# Patient Record
Sex: MALE | Race: WHITE | URBAN - METROPOLITAN AREA
[De-identification: names, ages, dates, MRNs, and addresses within clinical notes are randomized per-mention and may not be internally consistent; named-entity substitution may affect disease eponyms.]

---

## 2019-11-14 ENCOUNTER — OFFICE VISIT (OUTPATIENT)
Dept: NEUROLOGY | Age: 64
End: 2019-11-14

## 2019-11-14 VITALS
DIASTOLIC BLOOD PRESSURE: 80 MMHG | SYSTOLIC BLOOD PRESSURE: 130 MMHG | HEART RATE: 61 BPM | HEIGHT: 74 IN | BODY MASS INDEX: 38.89 KG/M2 | WEIGHT: 303 LBS | TEMPERATURE: 97.6 F | OXYGEN SATURATION: 98 % | RESPIRATION RATE: 18 BRPM

## 2019-11-14 DIAGNOSIS — G25.0 ESSENTIAL TREMOR: Primary | ICD-10-CM

## 2019-11-14 PROBLEM — E66.01 SEVERE OBESITY (HCC): Status: ACTIVE | Noted: 2019-11-14

## 2019-11-14 RX ORDER — ASPIRIN 81 MG/1
TABLET ORAL DAILY
COMMUNITY

## 2019-11-14 RX ORDER — ASCORBIC ACID 500 MG
TABLET ORAL
COMMUNITY

## 2019-11-14 RX ORDER — LANOLIN ALCOHOL/MO/W.PET/CERES
1 CREAM (GRAM) TOPICAL DAILY
COMMUNITY

## 2019-11-14 RX ORDER — ATORVASTATIN CALCIUM 20 MG/1
TABLET, FILM COATED ORAL DAILY
COMMUNITY

## 2019-11-14 RX ORDER — CITALOPRAM 20 MG/1
TABLET, FILM COATED ORAL DAILY
COMMUNITY

## 2019-11-14 RX ORDER — BISMUTH SUBSALICYLATE 262 MG
1 TABLET,CHEWABLE ORAL DAILY
COMMUNITY

## 2019-11-14 NOTE — PROGRESS NOTES
HPI:  68-year-old male referred by Dr. Lisa Macdonald for evaluation of a tremor, comes with his wife. His wife note is a tremor of his head about 6 months ago. The patient never noticed it. He is not bothered by it. He reports that maybe he has a very minor tremor of both hands, but is not affecting his life in any significant way. He has a paternal uncle and aunt as well as a crossing on the paternal side who have tremors, 2 of them that he is in their heads. There is no specific diagnosis of Parkinson's disease in his family. His dad  at 80 without a tremor. 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 He denies any planovalgus, lateralizing weakness, changes in his speech or writing. He also has history of obstructive sleep apnea. He is under the care of Dr. Barbie Vang. He has been on CPAP compliantly every night for 4 to 5 years and responds great to this, cannot sleep without CPAP.       Social History     Socioeconomic History    Marital status:      Spouse name: Not on file    Number of children: Not on file    Years of education: Not on file    Highest education level: Not on file   Occupational History    Not on file   Social Needs    Financial resource strain: Not on file    Food insecurity:     Worry: Not on file     Inability: Not on file    Transportation needs:     Medical: Not on file     Non-medical: Not on file   Tobacco Use    Smoking status: Never Smoker    Smokeless tobacco: Never Used   Substance and Sexual Activity    Alcohol use: Not on file    Drug use: Not on file    Sexual activity: Not on file   Lifestyle    Physical activity:     Days per week: Not on file     Minutes per session: Not on file    Stress: Not on file   Relationships    Social connections:     Talks on phone: Not on file     Gets together: Not on file     Attends Hoahaoism service: Not on file     Active member of club or organization: Not on file     Attends meetings of clubs or organizations: Not on file     Relationship status: Not on file    Intimate partner violence:     Fear of current or ex partner: Not on file     Emotionally abused: Not on file     Physically abused: Not on file     Forced sexual activity: Not on file   Other Topics Concern    Not on file   Social History Narrative    Not on file       Family History   Problem Relation Age of Onset    Stroke Mother     Diabetes Father     Parkinsonism Paternal Aunt     Parkinsonism Paternal Uncle        Current Outpatient Medications   Medication Sig Dispense Refill    citalopram (CELEXA) 20 mg tablet Take  by mouth daily.  atorvastatin (LIPITOR) 20 mg tablet Take  by mouth daily.  aspirin delayed-release 81 mg tablet Take  by mouth daily.  multivitamin (ONE A DAY) tablet Take 1 Tab by mouth daily.  ascorbic acid, vitamin C, (VITAMIN C) 500 mg tablet Take  by mouth.  glucosamine-chondroitin (ARTHX) 500-400 mg cap Take 1 Cap by mouth daily.  docosahexanoic acid/epa (FISH OIL PO) Take  by mouth.          Past Medical History:   Diagnosis Date    Anxiety disorder     Depression        Past Surgical History:   Procedure Laterality Date    HX ORTHOPAEDIC         Not on File    Patient Active Problem List   Diagnosis Code    Severe obesity ([facility] [address] E66.01         Review of Systems:   Constitutional: no fever or chills  Skin denies rash or itching  HEENT:  Denies tinnitus, hearing loss, or visual changes  Respiratory: denies shortness of breath  Cardiovascular: denies chest pain, dyspnea on exertion  Gastrointestinal: does not report nausea or vomiting  Genitourinary: does not report dysuria or incontinence  Musculoskeletal: does not report joint pain or swelling  Endocrine: denies weight change  Hematology: denies easy bruising or bleeding   Neurological: as above in HPI      PHYSICAL EXAMINATION:         Vital signs:    Visit Vitals  BP 130/80 (BP 1 Location: Left arm, BP Patient Position: Sitting)   Pulse 61   Temp 97.6 °F (36.4 °C)   Resp 18   Ht 6' 2\" (1.88 m)   Wt 137.4 kg (303 lb)   SpO2 98%   BMI 38.90 kg/m²         GENERAL:                  Well developed, well nourished, in no apparent distress. HEART:                       RR, no murmurs  EXTREMITIES:           No edema is identified. Pulses are +2. HEAD:                         Normocephalic, atraumatic. NEUROLOGIC EXAMINATION       MENTAL STATUS:     Awake, alert, and oriented x 4. Attention and STM are grossly normal. There is no aphasia. Fund of knowledge is adequate. Mood and affect are appropriate  CRANIAL NERVES:   Visual fields are full to confrontation. Pupils are reactive to light and accommodation. Blink rate is normal  Extraocular movements are intact and there is no nystagmus. Facial sensation is normal  Face is symmetrical.  There is no masked facies  Hard of hearing  SCM/TPZ 5/5  Tongue protrudes midline, palate elevates symmetrically. MOTOR:          5/5 strength throughout, normal tone. There is no bradykinesia. CEREBELLAR:           Very mild postural and symmetric hand tremors, no head tremor observed, no resting tremor, no voice tremor. Essential tremors affecting the headache,     SENSORY:     Normal distal pinprick, proprioception, and vibration. Romberg is neg. DTR's:                         +2 throughout, no long tract signs                 GAIT:                           Normal gait, no retropulsive instability         Impression: Essential tremors affecting head as well as hand, with a relevant family history. He has no symptoms of Parkinson's disease. Plan: 1-reassured the patient about the diagnosis.   Counseled him about the things that could aggravate the tremors, such as use of SSRI, sleep deprivation, stress, and excessive caffeine, and things that could make them better, such as alcohol, which I did not encourage him to do.   2-discussed potential treatment options should he does not need now and success rates. 3-reassured him that I see no evidence whatsoever to suspect Parkinson's disease and that he does not need any additional evaluation. He will return to neurology if the tremors worsen to the point they start interfering with his life. PLEASE NOTE:   Portions of this document may have been produced using voice recognition software. Unrecognized errors in transcription may be present. This note will not be viewable in [address]

## 2019-11-14 NOTE — PROGRESS NOTES
Rojelio Moreno presents today for No chief complaint on file. Is someone accompanying this pt? Yes, wife    Is the patient using any DME equipment during [address]? [employer]    Depression Screening:  3 most recent PHQ Screens 11/14/2019   Little interest or pleasure in doing things Not at all   Feeling down, depressed, irritable, or hopeless Not at all   Total Score PHQ 2 0       Learning Assessment:  No flowsheet data found. Abuse Screening:  No flowsheet data found. Fall Risk  No flowsheet data found. Coordination of Care:  1. Have you been to the ER, urgent care clinic since your last visit? Hospitalized since your last visit? no    2. Have you seen or consulted any other health care providers outside of the [address] [place] [address] since your last visit? Include any pap smears or colon screening.  Yes, Otoniel

## 2019-11-14 NOTE — PATIENT INSTRUCTIONS
Thank you for choosing [employer] and [facility] for your     care. You may receive a survey about your visit. We appreciate you taking time     to complete this survey as we use your feedback to improve our services. We     realize we are not perfect, but we strive to provide excellent care.

## 2019-11-14 NOTE — LETTER
11/14/19 Patient: Miranda De Jesus YOB: 1955 Date of Visit: 11/14/2019 April Lewis MD 
[address] [place], [place] [address] VIA Facsimile: [phone] Dear April Lewis MD, Thank you for referring Mr. Miranda De Jesus to [facility] for evaluation. My notes for this consultation are attached. If you have questions, please do not hesitate to call me. I look forward to following your patient along with you.  
 
 
Sincerely, 
 
Heather Zelaya MD